# Patient Record
Sex: MALE | URBAN - METROPOLITAN AREA
[De-identification: names, ages, dates, MRNs, and addresses within clinical notes are randomized per-mention and may not be internally consistent; named-entity substitution may affect disease eponyms.]

---

## 2018-10-13 ENCOUNTER — HOSPITAL ENCOUNTER (OUTPATIENT)
Age: 28
Discharge: HOME | End: 2018-10-15

## 2018-10-13 PROCEDURE — 82480 ASSAY SERUM CHOLINESTERASE: CPT

## 2018-10-13 PROCEDURE — 82482 ASSAY RBC CHOLINESTERASE: CPT

## 2018-10-19 LAB — MISCELLANEOUS LAB TEST RESULT: NORMAL

## 2022-09-01 ENCOUNTER — OFFICE VISIT (OUTPATIENT)
Dept: PRIMARY CARE CLINIC | Age: 32
End: 2022-09-01
Payer: OTHER GOVERNMENT

## 2022-09-01 VITALS
WEIGHT: 259 LBS | TEMPERATURE: 97.8 F | DIASTOLIC BLOOD PRESSURE: 88 MMHG | SYSTOLIC BLOOD PRESSURE: 132 MMHG | OXYGEN SATURATION: 96 % | HEART RATE: 93 BPM | HEIGHT: 77 IN | BODY MASS INDEX: 30.58 KG/M2 | RESPIRATION RATE: 18 BRPM

## 2022-09-01 DIAGNOSIS — M54.50 ACUTE BILATERAL LOW BACK PAIN WITHOUT SCIATICA: ICD-10-CM

## 2022-09-01 DIAGNOSIS — Z76.89 ESTABLISHING CARE WITH NEW DOCTOR, ENCOUNTER FOR: ICD-10-CM

## 2022-09-01 DIAGNOSIS — Z00.00 WELL ADULT EXAM: Primary | ICD-10-CM

## 2022-09-01 DIAGNOSIS — E66.09 CLASS 1 OBESITY DUE TO EXCESS CALORIES WITHOUT SERIOUS COMORBIDITY WITH BODY MASS INDEX (BMI) OF 30.0 TO 30.9 IN ADULT: ICD-10-CM

## 2022-09-01 PROCEDURE — 99385 PREV VISIT NEW AGE 18-39: CPT | Performed by: FAMILY MEDICINE

## 2022-09-01 SDOH — ECONOMIC STABILITY: FOOD INSECURITY: WITHIN THE PAST 12 MONTHS, YOU WORRIED THAT YOUR FOOD WOULD RUN OUT BEFORE YOU GOT MONEY TO BUY MORE.: NEVER TRUE

## 2022-09-01 SDOH — ECONOMIC STABILITY: FOOD INSECURITY: WITHIN THE PAST 12 MONTHS, THE FOOD YOU BOUGHT JUST DIDN'T LAST AND YOU DIDN'T HAVE MONEY TO GET MORE.: NEVER TRUE

## 2022-09-01 ASSESSMENT — ENCOUNTER SYMPTOMS
DIARRHEA: 0
SORE THROAT: 0
WHEEZING: 0
EYE REDNESS: 0
BLOOD IN STOOL: 0
CONSTIPATION: 0
VOMITING: 0
ABDOMINAL PAIN: 0
COUGH: 0
NAUSEA: 0
PHOTOPHOBIA: 0
SHORTNESS OF BREATH: 0
RHINORRHEA: 0

## 2022-09-01 ASSESSMENT — PATIENT HEALTH QUESTIONNAIRE - PHQ9
SUM OF ALL RESPONSES TO PHQ QUESTIONS 1-9: 0
SUM OF ALL RESPONSES TO PHQ QUESTIONS 1-9: 0
SUM OF ALL RESPONSES TO PHQ9 QUESTIONS 1 & 2: 0
SUM OF ALL RESPONSES TO PHQ QUESTIONS 1-9: 0
SUM OF ALL RESPONSES TO PHQ QUESTIONS 1-9: 0
2. FEELING DOWN, DEPRESSED OR HOPELESS: 0
1. LITTLE INTEREST OR PLEASURE IN DOING THINGS: 0

## 2022-09-01 ASSESSMENT — SOCIAL DETERMINANTS OF HEALTH (SDOH): HOW HARD IS IT FOR YOU TO PAY FOR THE VERY BASICS LIKE FOOD, HOUSING, MEDICAL CARE, AND HEATING?: NOT HARD AT ALL

## 2022-09-01 NOTE — PROGRESS NOTES
2022    Chief Complaint   Patient presents with    Established New Doctor    Lower Back Pain     Feels terrible pain, hard to stand up straight and walk, feels like muscle is in wrong place. Stretching and otc remedies not helping     HPI  Orlene Job (:  1990) is a 33 y.o. male, here for evaluation of the following medical concerns:    Significant history is of broken bones and concussion. Here to establish care. Working at base locally. . Patient is in the [employer]. Low back pain issues: Started in his middle 20's. Reports a skiing accident in , patient was knocked out during the accident. No recent imaging. Has had multiple flare ups in the past. Recently had a flare up, 2 weeks ago, L>R, worse with movement/activity, no radicular symptoms. Symptoms have essentially resolved. However this was the worst flareup that he has had. BMI slightly elevated at 30. 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 Patient not interested in updating labs today. Will need to try to reach out and obtain patient's previous medical records/labs. Review of Systems   Constitutional:  Negative for chills, fatigue and fever. HENT:  Negative for congestion, hearing loss, postnasal drip, rhinorrhea, sneezing, sore throat and tinnitus. Eyes:  Negative for photophobia and redness. Respiratory:  Negative for cough, shortness of breath and wheezing. Cardiovascular:  Negative for chest pain, palpitations and leg swelling. Gastrointestinal:  Negative for abdominal pain, blood in stool, constipation, diarrhea, nausea and vomiting. Endocrine: Negative for polydipsia and polyuria. Genitourinary:  Negative for difficulty urinating, dysuria, frequency and hematuria. Musculoskeletal:  Negative for arthralgias and myalgias. Skin:  Negative for rash. Neurological:  Negative for dizziness, syncope, weakness, light-headedness, numbness and headaches. Psychiatric/Behavioral:  The patient is not nervous/anxious. Prior to Visit Medications    Not on File        No Known Allergies    History reviewed. No pertinent past medical history. Past Surgical History:   Procedure Laterality Date    FINGER TENDON REPAIR Right     3rd digit.        Family History   Problem Relation Age of Onset    Cancer Paternal Grandmother         Lung    Cancer Paternal Grandfather         Lung       Immunization History   Administered Date(s) Administered    COVID-19, MODERNA BLUE border, Primary or Immunocompromised, (age 12y+), IM, 100 mcg/0.5mL 11/02/2021    Hepatitis A 09/16/2009, 05/18/2010    Influenza A ([id number]) Vaccine IM 01/09/2010    Influenza Virus Vaccine 11/09/2010, 10/19/2011, 09/10/2012, 10/15/2014, 10/28/2016    Influenza, FLUMIST, (age 2-51 y), Live, Intranasal, 0.2mL 09/27/2013, 10/13/2015    Meningococcal MCV4P (Menactra) 09/10/2009    Meningococcal MPSV4 (Menomune) 11/03/2014    Polio IPV (IPOL) 09/10/2009    Tdap (Boostrix, Adacel) 09/10/2009    Typhoid Vi capsular polysaccharide (Typhim VI) 06/12/2013, 07/07/2015    Yellow Fever (YF-Vax) 06/12/2013       Health Maintenance   Topic Date Due    Pneumococcal 0-64 years Vaccine (1 - PCV) Never done    DTaP/Tdap/Td vaccine (2 - Td or Tdap) 09/10/2019    COVID-19 Vaccine (2 - Moderna series) 11/30/2021    Flu vaccine (1) 09/01/2022    Depression Screen  09/01/2023    Hepatitis A vaccine  Completed    Meningococcal (ACWY) vaccine  Completed    Hepatitis B vaccine  Aged Out    Hib vaccine  Aged Out    Varicella vaccine  Discontinued    Hepatitis C screen  Discontinued    HIV screen  Discontinued       Social History     Socioeconomic History    Marital status: Unknown     Spouse name: Not on file    Number of children: Not on file    Years of education: Not on file    Highest education level: Not on file   Occupational History    Not on file   Tobacco Use    Smoking status: Some Days     Types: Cigarettes Smokeless tobacco: Former     Quit date: 8/25/2022    Tobacco comments:     socially   Substance and Sexual Activity    Alcohol use: Yes     Comment: Socially, few a month. Drug use: Never    Sexual activity: Not on file   Other Topics Concern    Not on file   Social History Narrative    Not on file     Social Determinants of Health     Financial Resource Strain: Low Risk     Difficulty of Paying Living Expenses: Not hard at all   Food Insecurity: No Food Insecurity    Worried About Running Out of Food in the Last Year: Never true    Ran Out of Food in the Last Year: Never true   Transportation Needs: Not on file   Physical Activity: Not on file   Stress: Not on file   Social Connections: Not on file   Intimate Partner Violence: Not on file   Housing Stability: Not on file         Vitals:    09/01/22 1517   BP: 132/88   Pulse: 93   Resp: 18   Temp: 97.8 °F (36.6 °C)   TempSrc: Temporal   SpO2: 96%   Weight: 259 lb (117.5 kg)   Height: 6' 5\" (1.956 m)       Estimated body mass index is 30.71 kg/m² as calculated from the following:    Height as of this encounter: 6' 5\" (1.956 m). Weight as of this encounter: 259 lb (117.5 kg). Physical Exam  Vitals and nursing note reviewed. Constitutional:       Appearance: He is well-developed. HENT:      Head: Normocephalic. Right Ear: Tympanic membrane and external ear normal.      Left Ear: Tympanic membrane and external ear normal.   Eyes:      Extraocular Movements: Extraocular movements intact. Conjunctiva/sclera: Conjunctivae normal.      Pupils: Pupils are equal, round, and reactive to light. Neck:      Trachea: Trachea normal.   Cardiovascular:      Rate and Rhythm: Normal rate and regular rhythm. Pulses:           Radial pulses are 2+ on the right side and 2+ on the left side. Posterior tibial pulses are 2+ on the right side and 2+ on the left side. Heart sounds: Normal heart sounds. No murmur heard.   Pulmonary:      Effort: Pulmonary effort is normal. No respiratory distress. Breath sounds: Normal breath sounds. No decreased breath sounds, wheezing or rales. Abdominal:      General: Bowel sounds are normal. There is no distension. Palpations: Abdomen is soft. Tenderness: There is no abdominal tenderness. There is no rebound. Musculoskeletal:         General: Normal range of motion. Cervical back: Neck supple. Right lower leg: No edema. Left lower leg: No edema. Skin:     General: Skin is warm and dry. Findings: No rash. Neurological:      Mental Status: He is alert and oriented to person, place, and time. Cranial Nerves: No cranial nerve deficit. Sensory: No sensory deficit. Deep Tendon Reflexes:      Reflex Scores:       Patellar reflexes are 2+ on the right side and 2+ on the left side. Psychiatric:         Mood and Affect: Mood normal.         Behavior: Behavior normal.     There are no problems to display for this patient. ASSESSMENT/PLAN:  Sadiq Bolanos was seen today for established new doctor and lower back pain. Diagnoses and all orders for this visit:    Well adult exam  Patient with recommendations reviewed and advised. Establishing care with new doctor, encounter for  Reviewed and updated at length. Need to try to obtain patient's previous medical records. Acute bilateral low back pain without sciatica  -     XR LUMBAR SPINE (MIN 4 VIEWS); Future  -     External Referral To Physical Therapy  Patient's symptoms have essentially resolved. However his most recent episode/flareup was one of the worst.  Patient will have x-rays performed today. Referral to physical therapy for rehab and strengthening of his back muscles along with stretching. For any additional flares patient advised on conservative management. Consider steroid taper if necessary.     Class 1 obesity due to excess calories without serious comorbidity with body mass index (BMI) of 30.0 to 30.9 in adult  Lifestyle modifications advisable. Patient to have previous medical records and labs forwarded to myself for review. Return in about 1 year (around 9/1/2023) for Physical.    Educational materials and/or home exercises printed for patient's review and were included in patient instructions on his/her After Visit Summary and given to patient at the end of visit. Counseled regarding above diagnosis, including possible risks and complications,  especially if left uncontrolled. Counseled regarding the possible side effects, risks, benefits and alternatives to treatment; patient and/or guardianverbalizes understanding, agrees, feels comfortable with and wishes to proceed with above treatment plan. Advised patient to call with any new medication issues, and read all Rx info from pharmacy to assure aware of all possible risks and side effects of medication before taking. Reviewed age and gender appropriate health screening exams and vaccinations. Advised patient regarding importance of keeping up withrecommended health maintenance and to schedule as soon as possible if overdue, as this is important in assessing for undiagnosed pathology, especially cancer, as well as protecting against potentially harmful/lifethreatening disease. Patient and/or guardian verbalizes understanding and agrees with abovecounseling, assessment and plan. All questions answered. An  electronic signature was used to authenticatethis note.     --Redd Mcbride MD on 9/1/22 at 8:06 AM EDT